# Patient Record
Sex: MALE | Race: WHITE | NOT HISPANIC OR LATINO | URBAN - NONMETROPOLITAN AREA
[De-identification: names, ages, dates, MRNs, and addresses within clinical notes are randomized per-mention and may not be internally consistent; named-entity substitution may affect disease eponyms.]

---

## 2019-07-29 ENCOUNTER — OFFICE VISIT CONVERTED (OUTPATIENT)
Dept: FAMILY MEDICINE CLINIC | Age: 29
End: 2019-07-29
Attending: NURSE PRACTITIONER

## 2019-09-11 ENCOUNTER — OFFICE VISIT CONVERTED (OUTPATIENT)
Dept: OTOLARYNGOLOGY | Facility: CLINIC | Age: 29
End: 2019-09-11
Attending: OTOLARYNGOLOGY

## 2019-09-16 ENCOUNTER — HOSPITAL ENCOUNTER (OUTPATIENT)
Dept: OTHER | Facility: HOSPITAL | Age: 29
Discharge: HOME | End: 2019-09-16

## 2019-09-16 ENCOUNTER — OFFICE VISIT CONVERTED (OUTPATIENT)
Dept: FAMILY MEDICINE CLINIC | Age: 29
End: 2019-09-16
Attending: NURSE PRACTITIONER

## 2021-05-15 VITALS
WEIGHT: 175.25 LBS | RESPIRATION RATE: 18 BRPM | DIASTOLIC BLOOD PRESSURE: 69 MMHG | OXYGEN SATURATION: 99 % | HEIGHT: 69 IN | SYSTOLIC BLOOD PRESSURE: 133 MMHG | HEART RATE: 62 BPM | BODY MASS INDEX: 25.96 KG/M2 | TEMPERATURE: 97.8 F
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
## 2021-05-18 NOTE — PROGRESS NOTES
Venu Espino 1990     Office/Outpatient Visit    Visit Date: Mon, Sep 16, 2019 04:09 pm    Provider: Ruth Patrick N.P. (Assistant: Carlene Miles MA)    Location: [facility]        Electronically signed by Ruth Patrick N.P. on  09/20/2019 10:05:06 AM                             SUBJECTIVE:        CC:     Tristan is a 29 year old White male.  Patient presents today to discuss STD testing         HPI:         Genital warts: Pt states seeing 2 lesions on his penis, has had genital warts in the past and knew he needed tx.      ROS:     CONSTITUTIONAL:  Negative for chills, fatigue and fever.      CARDIOVASCULAR:  Negative for chest pain, orthopnea, paroxysmal nocturnal dyspnea and pedal edema.      RESPIRATORY:  Negative for dyspnea and cough.      GASTROINTESTINAL:  Negative for abdominal pain, heartburn, constipation, diarrhea, and stool changes.      NEUROLOGICAL:  Negative for dizziness, headaches, paresthesias, and weakness.      PSYCHIATRIC:  Negative for anxiety and depression.          PMH/FMH/SH:     Last Reviewed on 9/16/2019 05:03 PM by Ruth Patrick    Past Medical History:         Chicken pox         Surgical History:         Appendectomy: at age 21;     Other Surgeries:    Hernia Repair: at age 7 months;         Family History:     Father: Healthy     Mother: Healthy     Brother(s): 2 alive and healthy brother(s) total     Sister(s): 3 alive and healthy sister(s) total         Social History:     Occupation: [employer]     Marital Status: Single     Children: None         Tobacco/Alcohol/Supplements:     Last Reviewed on 9/16/2019 05:03 PM by Ruth Patrick    Tobacco: Current Smoker: He currently smokes every day, 1-5 cigarettes per day; (start age 19 pack-year history).          Substance Abuse History:     Last Reviewed on 9/16/2019 05:03 PM by Ruth Patrick        Mental Health History:     Last Reviewed on 9/16/2019 05:03 PM by Ruth Patrick         Communicable Diseases (eg STDs):     Last Reviewed on 9/16/2019 05:03 PM by Ruth Patrick            Current Problems:     Last Reviewed on 9/16/2019 05:03 PM by Ruth Patrick    Genital warts     Nasal trauma     Migraine headache with aura     Screening for depression         Immunizations:     DTaP 1991     DTaP 1991     DTaP 1991     DTaP 7/21/1992     DTaP 4/17/1995     PedvaxHIB (Hib PRP-OMP) 1991     PedvaxHIB (Hib PRP-OMP) 1991     PedvaxHIB (Hib PRP-OMP) 1991     PedvaxHIB (Hib PRP-OMP) 7/21/1992     Hep B (pedi/adol, 3-dose schedule) 6/12/2002     Hep B (pedi/adol, 3-dose schedule) 7/31/2002     Hep B (pedi/adol, 3-dose schedule) 3/21/2003     OPV  Poliovirus, live (oral) 1991     OPV  Poliovirus, live (oral) 1991     OPV  Poliovirus, live (oral) 7/21/1992     OPV  Poliovirus, live (oral) 4/17/1995     MMR  (Measles-Mumps-Rubella), live 6/12/2002     MMR  (Measles-Mumps-Rubella), live 7/21/1992     FluMist 12/15/2003     Tdap (Tetanus, reduced diph, acellular pertussis) 3/12/2002         Allergies:     Last Reviewed on 9/16/2019 05:03 PM by Ruth Patrick      No Known Drug Allergies.         Current Medications:     Last Reviewed on 9/16/2019 05:03 PM by Ruth Patrick      No Known Medications.         OBJECTIVE:        Vitals:         Current: 9/16/2019 4:13:39 PM    Ht:  5 ft, 9 in;  Wt: 171.6 lbs;  BMI: 25.3    T: 98.2 F (oral);  BP: 158/81 mm Hg (right arm, sitting);  P: 66 bpm (right arm (BP Cuff), sitting)        Exams:     PHYSICAL EXAM:     GENERAL: Vitals recorded well developed, well nourished;  well groomed;  no apparent distress;     EYES: lids and lacrimal system are normal in appearance; extraocular movements intact; conjunctiva and cornea are normal; PERRLA;     NECK:  supple, full ROM; no thyromegaly; no carotid bruits;     RESPIRATORY: normal respiratory rate and pattern with no distress; normal breath sounds with no rales, rhonchi,  wheezes or rubs;     CARDIOVASCULAR: normal rate; rhythm is regular;  normal S1; normal S2; no systolic murmur; no cyanosis; no edema;     SKIN: 2 small genital warts to penis.;     MUSCULOSKELETAL:  Normal range of motion, strength and tone;     NEUROLOGICAL:  cranial nerves, motor and sensory function, reflexes, gait and coordination are all intact;     PSYCHIATRIC:  appropriate affect and demeanor; normal speech pattern; grossly normal memory;         ASSESSMENT:           078.11   A63.0  Genital warts              DDx:         ORDERS:         Meds Prescribed:       Condylox (Podofilox) 0.5% Topical Gel Apply to affected area q12h for 3 days then stop for 4 days.  Repeat weekly as directed.  #3.5 (Three point Five) ml Refills: 1         Lab Orders:       [id number]  [facility] STD PANEL: Herpes I & II, HIV, RPR, GC/CHLAMYDIA  (Send-Out)         [id number]  [facility] Urine Trichonomas  (Send-Out)                   PLAN:          Genital warts     LABORATORY:  Labs ordered to be performed today include STD Testing: STD Panel : HIV, HSV I & II,RPR, GC/Chlamydia Urine Trichomonas.            Prescriptions:       Condylox (Podofilox) 0.5% Topical Gel Apply to affected area q12h for 3 days then stop for 4 days.  Repeat weekly as directed.  #3.5 (Three point Five) ml Refills: 1           Orders:       [id number]  [facility] STD PANEL: Herpes I & II, HIV, RPR, GC/CHLAMYDIA  (Send-Out)         [id number]  [facility] Urine Trichonomas  (Send-Out)               CHARGE CAPTURE:           Primary Diagnosis:     078.11 Genital warts            A63.0    Anogenital (venereal) warts              Orders:          [id number]   Office/outpatient visit; established patient, level 3  (In-House)

## 2021-07-01 VITALS
TEMPERATURE: 98 F | WEIGHT: 173.4 LBS | BODY MASS INDEX: 25.68 KG/M2 | HEIGHT: 69 IN | DIASTOLIC BLOOD PRESSURE: 77 MMHG | SYSTOLIC BLOOD PRESSURE: 128 MMHG | HEART RATE: 64 BPM

## 2021-07-01 VITALS
BODY MASS INDEX: 25.42 KG/M2 | TEMPERATURE: 98.2 F | DIASTOLIC BLOOD PRESSURE: 81 MMHG | HEIGHT: 69 IN | SYSTOLIC BLOOD PRESSURE: 158 MMHG | HEART RATE: 66 BPM | WEIGHT: 171.6 LBS